# Patient Record
Sex: FEMALE | Race: WHITE
[De-identification: names, ages, dates, MRNs, and addresses within clinical notes are randomized per-mention and may not be internally consistent; named-entity substitution may affect disease eponyms.]

---

## 2021-09-14 ENCOUNTER — HOSPITAL ENCOUNTER (OUTPATIENT)
Dept: HOSPITAL 19 - SURG | Age: 56
LOS: 1 days | Discharge: HOME | End: 2021-09-15
Attending: ORTHOPAEDIC SURGERY
Payer: MEDICARE

## 2021-09-14 VITALS — DIASTOLIC BLOOD PRESSURE: 81 MMHG | HEART RATE: 67 BPM | SYSTOLIC BLOOD PRESSURE: 132 MMHG

## 2021-09-14 VITALS — HEART RATE: 64 BPM | SYSTOLIC BLOOD PRESSURE: 121 MMHG | DIASTOLIC BLOOD PRESSURE: 74 MMHG

## 2021-09-14 VITALS — DIASTOLIC BLOOD PRESSURE: 76 MMHG | SYSTOLIC BLOOD PRESSURE: 142 MMHG | TEMPERATURE: 98.2 F | HEART RATE: 87 BPM

## 2021-09-14 VITALS — DIASTOLIC BLOOD PRESSURE: 74 MMHG | TEMPERATURE: 98.5 F | HEART RATE: 69 BPM | SYSTOLIC BLOOD PRESSURE: 127 MMHG

## 2021-09-14 VITALS — WEIGHT: 181.44 LBS | HEIGHT: 69 IN | BODY MASS INDEX: 26.87 KG/M2

## 2021-09-14 VITALS — DIASTOLIC BLOOD PRESSURE: 91 MMHG | HEART RATE: 78 BPM | TEMPERATURE: 98.8 F | SYSTOLIC BLOOD PRESSURE: 129 MMHG

## 2021-09-14 VITALS — SYSTOLIC BLOOD PRESSURE: 152 MMHG | HEART RATE: 79 BPM | TEMPERATURE: 98.1 F | DIASTOLIC BLOOD PRESSURE: 85 MMHG

## 2021-09-14 VITALS — DIASTOLIC BLOOD PRESSURE: 89 MMHG | HEART RATE: 73 BPM | SYSTOLIC BLOOD PRESSURE: 143 MMHG

## 2021-09-14 VITALS — SYSTOLIC BLOOD PRESSURE: 121 MMHG | HEART RATE: 69 BPM | DIASTOLIC BLOOD PRESSURE: 76 MMHG

## 2021-09-14 VITALS — SYSTOLIC BLOOD PRESSURE: 120 MMHG | HEART RATE: 67 BPM | DIASTOLIC BLOOD PRESSURE: 68 MMHG

## 2021-09-14 DIAGNOSIS — F32.9: ICD-10-CM

## 2021-09-14 DIAGNOSIS — Z79.899: ICD-10-CM

## 2021-09-14 DIAGNOSIS — Z86.718: ICD-10-CM

## 2021-09-14 DIAGNOSIS — Z79.84: ICD-10-CM

## 2021-09-14 DIAGNOSIS — M17.12: Primary | ICD-10-CM

## 2021-09-14 DIAGNOSIS — E11.42: ICD-10-CM

## 2021-09-14 DIAGNOSIS — K21.9: ICD-10-CM

## 2021-09-14 DIAGNOSIS — F41.9: ICD-10-CM

## 2021-09-14 PROCEDURE — C1713 ANCHOR/SCREW BN/BN,TIS/BN: HCPCS

## 2021-09-14 PROCEDURE — A9284 NON-ELECTRONIC SPIROMETER: HCPCS

## 2021-09-14 PROCEDURE — C1776 JOINT DEVICE (IMPLANTABLE): HCPCS

## 2021-09-14 NOTE — NUR
PATIENT C/O PAIN IN LLE RATED AT 5-6 ON PAIN SCALE. GAVE PRN ROXYCODONE, ONE
TAB AND SCHEDULED TYLENOL. PATIENT REPOSITIONED TO COMFORT. PATIENT TOLERATING
JELLO WELL.  AT BEDSIDE. NO OTHER NEEDS.

## 2021-09-14 NOTE — NUR
0640: Patient arrived to outpatient surgery with , Fitz. Patient
prepped for OR with CHG scrub to left knee and ULYSSES hose applied to right lower
extremity. 18G placed in left forearm.

## 2021-09-14 NOTE — NUR
PATIENT ADMITED INTO ROOM 330 POST OP LTK. A&O. VSS. NO C/O PAIN OR NAUSEA.
LLE DRESSING IS CD&I WITH ACEWRAP AND ICE PACK INPLACE. TEDS TO RLE. SCD'S TO
BLE. POSITIVE PEDAL PULSES TO BLE. IV FLUIDS INFUSING INTO LEFT FORARM IV. BS
IN PACU . LIQUIDS AT BEDSIDE. HEAD TO TOE ASSESSMENT COMPLETE. ORIENTED
TO ROOM.  AT BEDSIDE. CALL LIGHT IN REACH.

## 2021-09-15 VITALS — TEMPERATURE: 98.3 F | SYSTOLIC BLOOD PRESSURE: 152 MMHG | DIASTOLIC BLOOD PRESSURE: 82 MMHG | HEART RATE: 87 BPM

## 2021-09-15 VITALS — SYSTOLIC BLOOD PRESSURE: 147 MMHG | DIASTOLIC BLOOD PRESSURE: 85 MMHG | TEMPERATURE: 98.5 F | HEART RATE: 80 BPM

## 2021-09-15 VITALS — SYSTOLIC BLOOD PRESSURE: 142 MMHG | HEART RATE: 84 BPM | TEMPERATURE: 98.6 F | DIASTOLIC BLOOD PRESSURE: 83 MMHG

## 2021-09-15 LAB
HCT VFR BLD AUTO: 30.8 % (ref 37–47)
HGB BLD-MCNC: 10.5 G/DL (ref 12.5–16)

## 2021-09-15 NOTE — NUR
PT ASSISTED TO BSC WITH SBA. GAITBELT ET WALKER USED. PT TOLERATES ACTIVITY
WELL, STATES THAT PAIN IS "NOT BAD", ET RATES IT 5/10. PT URINATES ET RETURNS
TO BED. SCDS ON NANCY LEGS. PO TYLENOL ADMINSISTERED ET ANCEF IV TO BEGIN
INFUSING THROUGH PT'S SALINE LOCK USING BARD INFUSER. PT DENIES ANY NEEDS. ISAAK
LIGHT WITHIN REACH.

## 2021-09-15 NOTE — NUR
PT ASSISTED TO BR USING WALKER, GAITBELT ET SBA. GAIT IS STEADY. PT REPORTS
HAVING BM BUT HAD FLUSHED TOILET ON OWN. ASSISTED BACK TO BED. PT STATES THAT
PAIN IS MINIMAL @ THIS TIME, RATES 3/10. ICE PACK PLACED TO LEFT KNEE. DENIES
OTHER NEEDS. CALL LIGHT WITHIN REACH.

## 2021-09-15 NOTE — NUR
SW met with patient in her room while patient was sitting in chair. D/C plan
was discussed with patient. She lives in [place] with her husb in a one
level home. No 02 needs but will d/c with a walker. No other DME's. Patient
was fully independent prior with no assistive devices. PCP is Dr. Jaymie Ruelas in [place]. She has no difficulty obtaining her meds and uses the [employer] [facility] [employer] [place]. HONG discussed establishing DPOA but patient was already
given the form and she states she and her  will fill it out later.
Physical Therapy will be in to see patient this afternoon for a second time.
 
*D/C home likely pending p/t recommendations

## 2021-09-15 NOTE — NUR
DISCHARGE INSTRUCTIONS PROVIDED TO PT AND . QUESTIONS SOLICITED AND
ANSWERED IV DISCONTINUED. PT DISCHARGED TO POV.

## 2021-09-15 NOTE — NUR
PT UP WITH THERAPY, AMBULATES WITH SLOW STEADY GAIT. PAIN WELL CONTROLLED WITH
PO MEDS.  DRESSING CHANGE COMPLETE, AQUACELL PLACED OVER INCISION.

## 2022-08-23 ENCOUNTER — HOSPITAL ENCOUNTER (OUTPATIENT)
Dept: HOSPITAL 19 - SDCO | Age: 57
Discharge: HOME | End: 2022-08-23
Attending: UROLOGY
Payer: MEDICARE

## 2022-08-23 VITALS — DIASTOLIC BLOOD PRESSURE: 77 MMHG | SYSTOLIC BLOOD PRESSURE: 137 MMHG | HEART RATE: 73 BPM

## 2022-08-23 VITALS — DIASTOLIC BLOOD PRESSURE: 86 MMHG | SYSTOLIC BLOOD PRESSURE: 147 MMHG | HEART RATE: 86 BPM

## 2022-08-23 VITALS — TEMPERATURE: 98 F

## 2022-08-23 VITALS — HEART RATE: 94 BPM

## 2022-08-23 VITALS — BODY MASS INDEX: 27.76 KG/M2 | HEIGHT: 69.02 IN | WEIGHT: 187.39 LBS

## 2022-08-23 VITALS — HEART RATE: 77 BPM | DIASTOLIC BLOOD PRESSURE: 83 MMHG | SYSTOLIC BLOOD PRESSURE: 165 MMHG

## 2022-08-23 VITALS — HEART RATE: 77 BPM | SYSTOLIC BLOOD PRESSURE: 156 MMHG | DIASTOLIC BLOOD PRESSURE: 96 MMHG

## 2022-08-23 VITALS — SYSTOLIC BLOOD PRESSURE: 150 MMHG | TEMPERATURE: 98.1 F | HEART RATE: 78 BPM | DIASTOLIC BLOOD PRESSURE: 89 MMHG

## 2022-08-23 VITALS — DIASTOLIC BLOOD PRESSURE: 94 MMHG | SYSTOLIC BLOOD PRESSURE: 151 MMHG | HEART RATE: 84 BPM

## 2022-08-23 DIAGNOSIS — Z86.16: ICD-10-CM

## 2022-08-23 DIAGNOSIS — N20.1: Primary | ICD-10-CM

## 2022-08-23 PROCEDURE — C1769 GUIDE WIRE: HCPCS

## 2022-08-23 PROCEDURE — C2617 STENT, NON-COR, TEM W/O DEL: HCPCS

## 2022-08-23 NOTE — NUR
PT ARRIVED FROM PACU AT 1845. PT IMMEDIATELY ABLE TO TOLERATE ORAL INTAKE AND
URINATED WITHOUT DIFFICULTY. PT AMBULATING AND NO COMPLAINTS OF PAIN OR
NAUSEA. CRN DISCHARGED PT, REMOVED IV

## 2022-09-22 ENCOUNTER — HOSPITAL ENCOUNTER (OUTPATIENT)
Dept: HOSPITAL 19 - COL.RAD | Age: 57
End: 2022-09-22
Attending: UROLOGY
Payer: MEDICARE

## 2022-09-22 DIAGNOSIS — E21.3: Primary | ICD-10-CM

## 2022-09-22 PROCEDURE — A9500 TC99M SESTAMIBI: HCPCS
